# Patient Record
Sex: FEMALE | Race: WHITE | NOT HISPANIC OR LATINO
[De-identification: names, ages, dates, MRNs, and addresses within clinical notes are randomized per-mention and may not be internally consistent; named-entity substitution may affect disease eponyms.]

---

## 2019-01-22 ENCOUNTER — RECORDS - HEALTHEAST (OUTPATIENT)
Dept: ADMINISTRATIVE | Facility: OTHER | Age: 79
End: 2019-01-22

## 2019-01-28 ENCOUNTER — COMMUNICATION - HEALTHEAST (OUTPATIENT)
Dept: CARDIOLOGY | Facility: CLINIC | Age: 79
End: 2019-01-28

## 2019-02-05 ENCOUNTER — COMMUNICATION - HEALTHEAST (OUTPATIENT)
Dept: CARDIOLOGY | Facility: CLINIC | Age: 79
End: 2019-02-05

## 2021-05-23 ENCOUNTER — HEALTH MAINTENANCE LETTER (OUTPATIENT)
Age: 81
End: 2021-05-23

## 2021-06-16 PROBLEM — I48.91 ATRIAL FIBRILLATION (H): Status: ACTIVE | Noted: 2019-01-21

## 2021-06-16 PROBLEM — R07.9 CHEST PAIN: Status: ACTIVE | Noted: 2017-06-28

## 2021-06-16 PROBLEM — I48.91 ATRIAL FIBRILLATION WITH RAPID VENTRICULAR RESPONSE (H): Status: ACTIVE | Noted: 2019-01-21

## 2021-06-23 NOTE — TELEPHONE ENCOUNTER
The monitor picked up a 4 second pause. Was she wearing her oral appliance for her CHEO? If so, we should have her see Dr. Dominguez in consult as she may need a pacer.    KL

## 2021-06-23 NOTE — TELEPHONE ENCOUNTER
----- Message from Diana Griffin, EPS sent at 1/28/2019  2:41 PM CST -----  Regarding: [employer] MD notify  MD notify for patient saved in her lifewatch folder for review.    Thanks!  Marline

## 2021-06-23 NOTE — TELEPHONE ENCOUNTER
Phone call to patient - informed her of results and Dr. Alford's response - patient stated she was wearing her oral device Monday night when episode occurred and was seen by her internist at [facility] Tuesday who reviewed records and ordered labs - patient plans to f/u at [facility] for further recommendations r.e. need for pacer and wanted to thank Dr. Alford for her care - update forwarded to Dr. Alford.  mg

## 2021-06-23 NOTE — TELEPHONE ENCOUNTER
Rec'd call from patient inquiring about monitor results - informed patient that preliminary results have not been reviewed by Dr. Alford - confirmed patient plans to f/u with [facility] for further recommendations as discussed 1-28-19 - reassured her that Dr. Pandey from [facility] can view preliminary results now - patient verbalized understanding and agreed to contact Dr. Pandey's office.  mg

## 2021-06-23 NOTE — TELEPHONE ENCOUNTER
"Per Dr. Alford's 1-22-19 inpt consult note plan:   \"1.  Continue patient's usual outpatient medications  2.  1-lead patch monitor at time of discharge for a total of 1 week  3.  Advise outpatient pharmacologic nuclear stress study to screen for coronary artery disease.\"     Please review MD notification transmission in [facility] folder on GDrive - no stress test order placed on discharge or f/u appt sched - any new orders?  mg      "

## 2021-09-12 ENCOUNTER — HEALTH MAINTENANCE LETTER (OUTPATIENT)
Age: 81
End: 2021-09-12

## 2022-06-19 ENCOUNTER — HEALTH MAINTENANCE LETTER (OUTPATIENT)
Age: 82
End: 2022-06-19

## 2022-11-19 ENCOUNTER — HEALTH MAINTENANCE LETTER (OUTPATIENT)
Age: 82
End: 2022-11-19

## 2023-07-01 ENCOUNTER — HEALTH MAINTENANCE LETTER (OUTPATIENT)
Age: 83
End: 2023-07-01